# Patient Record
Sex: MALE | Race: WHITE | Employment: FULL TIME | URBAN - METROPOLITAN AREA
[De-identification: names, ages, dates, MRNs, and addresses within clinical notes are randomized per-mention and may not be internally consistent; named-entity substitution may affect disease eponyms.]

---

## 2022-12-22 ENCOUNTER — OFFICE VISIT (OUTPATIENT)
Dept: DERMATOLOGY | Age: 47
End: 2022-12-22
Payer: COMMERCIAL

## 2022-12-22 DIAGNOSIS — L21.9 SEBORRHEIC DERMATITIS: Primary | ICD-10-CM

## 2022-12-22 DIAGNOSIS — L91.8 CUTANEOUS SKIN TAGS: ICD-10-CM

## 2022-12-22 DIAGNOSIS — L30.1 VESICULAR ECZEMA OF HANDS AND FEET: ICD-10-CM

## 2022-12-22 PROCEDURE — 11200 RMVL SKIN TAGS UP TO&INC 15: CPT | Performed by: DERMATOLOGY

## 2022-12-22 PROCEDURE — 99203 OFFICE O/P NEW LOW 30 MIN: CPT | Performed by: DERMATOLOGY

## 2022-12-22 RX ORDER — CLOBETASOL PROPIONATE 0.5 MG/G
CREAM TOPICAL
Qty: 30 G | Refills: 2 | Status: SHIPPED | OUTPATIENT
Start: 2022-12-22

## 2022-12-22 RX ORDER — COVID-19 ANTIGEN TEST
KIT MISCELLANEOUS
COMMUNITY

## 2022-12-22 RX ORDER — PYRAZINAMIDE 500 MG/1
TABLET ORAL
COMMUNITY

## 2022-12-22 RX ORDER — PIMECROLIMUS 10 MG/G
CREAM TOPICAL
Qty: 30 G | Refills: 2 | Status: SHIPPED | OUTPATIENT
Start: 2022-12-22

## 2024-01-03 ENCOUNTER — OFFICE VISIT (OUTPATIENT)
Dept: DERMATOLOGY | Age: 49
End: 2024-01-03
Payer: COMMERCIAL

## 2024-01-03 DIAGNOSIS — D48.5 NEOPLASM OF UNCERTAIN BEHAVIOR OF SKIN: ICD-10-CM

## 2024-01-03 DIAGNOSIS — L21.9 SEBORRHEIC DERMATITIS: Primary | ICD-10-CM

## 2024-01-03 DIAGNOSIS — D22.9 MULTIPLE NEVI: ICD-10-CM

## 2024-01-03 DIAGNOSIS — L82.1 SK (SEBORRHEIC KERATOSIS): ICD-10-CM

## 2024-01-03 DIAGNOSIS — L30.1 VESICULAR ECZEMA OF HANDS AND FEET: ICD-10-CM

## 2024-01-03 PROCEDURE — 11102 TANGNTL BX SKIN SINGLE LES: CPT | Performed by: DERMATOLOGY

## 2024-01-03 PROCEDURE — 99213 OFFICE O/P EST LOW 20 MIN: CPT | Performed by: DERMATOLOGY

## 2024-01-03 NOTE — PROGRESS NOTES
[facility]  Deni Nye MD  [phone]      Hamilton Inman  1975    48 y.o. male     Date of Visit: 1/3/2024    Chief Complaint: seborrheic dermatitis, dermatitis of the hands and feet    History of Present Illness:    1.  He returns today for chronic facial seborrheic dermatitis-prescribed Elidel cream at last visit, reports good control with intermittent use of it.      2.  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  He has rarely needed to use clobetasol cream.     3.  He reports an asymptomatic lesion on the left lateral forearm.    4.  He reports a recurrently bleeding lesion on the left nasal ala.    5.  He has moles on the trunk and extremities-not aware of any changes in size, color, or shape.      Review of Systems:  Gen: Feels well, good sense of health.    Past Medical History, Family History, Surgical History, Medications and Allergies reviewed.    History reviewed. No pertinent past medical history.  History reviewed. No pertinent surgical history.    No Known Allergies  No outpatient medications have been marked as taking for the 1/3/24 encounter (Office Visit) with Deni Nye MD.         Physical Examination       The following were examined and determined to be normal: Psych/Neuro, Scalp/hair, Conjunctivae/eyelids, Gums/teeth/lips, Neck, Breast/axilla/chest, Abdomen, Back, RUE, LUE, RLE, and Nails/digits.    The following were examined and determined to be abnormal: Head/face and LLE.     Well-appearing.    1.  Face clear today.  Right conchal bowl with an eroded scaly pink patch.    2.  Left medial heel with an eroded pink patch.    3.  Left lateral forearm with a small stuck on appearing verrucous tan papule.  Back with few scattered stuck on appearing verrucous brown papules.    4.  Left nasal ala near the left nasal alar crease is a small focally crusted but mostly smooth pink papule.         5.  Left medial cheek with a

## 2024-01-09 ENCOUNTER — TELEPHONE (OUTPATIENT)
Dept: DERMATOLOGY | Age: 49
End: 2024-01-09

## 2025-01-15 ENCOUNTER — OFFICE VISIT (OUTPATIENT)
Age: 50
End: 2025-01-15
Payer: COMMERCIAL

## 2025-01-15 DIAGNOSIS — L21.9 SEBORRHEIC DERMATITIS: Primary | ICD-10-CM

## 2025-01-15 DIAGNOSIS — D22.9 MULTIPLE NEVI: ICD-10-CM

## 2025-01-15 DIAGNOSIS — L82.1 SK (SEBORRHEIC KERATOSIS): ICD-10-CM

## 2025-01-15 PROCEDURE — 99213 OFFICE O/P EST LOW 20 MIN: CPT | Performed by: DERMATOLOGY

## 2025-01-15 RX ORDER — PIMECROLIMUS 10 MG/G
CREAM TOPICAL
Qty: 30 G | Refills: 2 | Status: SHIPPED | OUTPATIENT
Start: 2025-01-15

## 2025-01-15 NOTE — PROGRESS NOTES
[facility]  Deni Nye MD  [phone]      Hamilton Inman  1975    49 y.o. male     Date of Visit: 1/15/2025    Chief Complaint: moles, facial rash    History of Present Illness:    1.  Has history of seborrheic dermatitis of the face - previously well controlled with pimecrolimus 1% cream.  Ran out of medication recently and has been flaring.     2.  He also presents today for evaluation of multiple moles - not aware of any changes in size, color or shape.       3.  He has few asymptomatic growths on the back.     Hx of vesicular hand eczema - clobetasol cream.       Review of Systems:  Gen: Feels well, good sense of health.      Past Medical History, Family History, Surgical History, Medications and Allergies reviewed.    History reviewed. No pertinent past medical history.  History reviewed. No pertinent surgical history.    No Known Allergies  Outpatient Medications Marked as Taking for the 1/15/25 encounter (Office Visit) with Deni Nye MD   Medication Sig Dispense Refill    pimecrolimus (ELIDEL) 1 % cream Apply to the red scaly areas of the face and right ear twice daily as needed. 30 g 2    acetaminophen-codeine (TYLENOL #3) 300-30 MG per tablet Take by mouth.      Naproxen Sodium 220 MG CAPS Take by mouth      clobetasol (TEMOVATE) 0.05 % cream Apply to the rash on the left foot and fingers twice daily for 2 weeks or until improved. 30 g 2         Physical Examination       Well appearing.    1.  Lower central face with ill defined erythematous patches.     2.  Trunk and extremities with several well defined round to oval smooth brown macules.     3.  Back with few stuck on appearing light brown papules.         Assessment and Plan     1. Seborrheic dermatitis of the face - moderate severity, recent flaring off of the medication    Resume pimecrolimus 1% cream twice daily.  New prescription sent.       2. Multiple nevi - benign appearing    Sun protective